# Patient Record
Sex: MALE | Race: WHITE | Employment: OTHER | URBAN - METROPOLITAN AREA
[De-identification: names, ages, dates, MRNs, and addresses within clinical notes are randomized per-mention and may not be internally consistent; named-entity substitution may affect disease eponyms.]

---

## 2017-01-01 ENCOUNTER — ANESTHESIA (OUTPATIENT)
Dept: ENDOSCOPY | Age: 67
End: 2017-01-01
Payer: MEDICARE

## 2017-01-01 ENCOUNTER — HOSPITAL ENCOUNTER (OUTPATIENT)
Age: 67
Setting detail: OUTPATIENT SURGERY
Discharge: HOME | End: 2017-09-14
Attending: INTERNAL MEDICINE | Admitting: INTERNAL MEDICINE
Payer: MEDICARE

## 2017-01-01 ENCOUNTER — ANESTHESIA EVENT (OUTPATIENT)
Dept: ENDOSCOPY | Age: 67
End: 2017-01-01
Payer: MEDICARE

## 2017-01-01 VITALS
DIASTOLIC BLOOD PRESSURE: 75 MMHG | OXYGEN SATURATION: 100 % | WEIGHT: 140 LBS | RESPIRATION RATE: 16 BRPM | HEART RATE: 56 BPM | TEMPERATURE: 98.6 F | BODY MASS INDEX: 24.8 KG/M2 | HEIGHT: 63 IN | SYSTOLIC BLOOD PRESSURE: 148 MMHG

## 2017-01-01 PROCEDURE — 74011250636 HC RX REV CODE- 250/636: Performed by: ANESTHESIOLOGY

## 2017-01-01 PROCEDURE — 74011250636 HC RX REV CODE- 250/636

## 2017-01-01 PROCEDURE — 74011250636 HC RX REV CODE- 250/636: Performed by: INTERNAL MEDICINE

## 2017-01-01 PROCEDURE — 76060000031 HC ANESTHESIA FIRST 0.5 HR: Performed by: INTERNAL MEDICINE

## 2017-01-01 PROCEDURE — 76040000025: Performed by: INTERNAL MEDICINE

## 2017-01-01 PROCEDURE — 74011000250 HC RX REV CODE- 250

## 2017-01-01 RX ORDER — SODIUM CHLORIDE, SODIUM LACTATE, POTASSIUM CHLORIDE, CALCIUM CHLORIDE 600; 310; 30; 20 MG/100ML; MG/100ML; MG/100ML; MG/100ML
100 INJECTION, SOLUTION INTRAVENOUS CONTINUOUS
Status: DISCONTINUED | OUTPATIENT
Start: 2017-01-01 | End: 2017-01-01 | Stop reason: HOSPADM

## 2017-01-01 RX ORDER — HEPARIN 100 UNIT/ML
500 SYRINGE INTRAVENOUS AS NEEDED
Status: DISCONTINUED | OUTPATIENT
Start: 2017-01-01 | End: 2017-01-01 | Stop reason: HOSPADM

## 2017-01-01 RX ORDER — LIDOCAINE HYDROCHLORIDE 20 MG/ML
INJECTION, SOLUTION EPIDURAL; INFILTRATION; INTRACAUDAL; PERINEURAL AS NEEDED
Status: DISCONTINUED | OUTPATIENT
Start: 2017-01-01 | End: 2017-01-01 | Stop reason: HOSPADM

## 2017-01-01 RX ORDER — SODIUM CHLORIDE 0.9 % (FLUSH) 0.9 %
5-10 SYRINGE (ML) INJECTION AS NEEDED
Status: CANCELLED | OUTPATIENT
Start: 2017-01-01

## 2017-01-01 RX ORDER — SODIUM CHLORIDE, SODIUM LACTATE, POTASSIUM CHLORIDE, CALCIUM CHLORIDE 600; 310; 30; 20 MG/100ML; MG/100ML; MG/100ML; MG/100ML
100 INJECTION, SOLUTION INTRAVENOUS CONTINUOUS
Status: CANCELLED | OUTPATIENT
Start: 2017-01-01

## 2017-01-01 RX ORDER — PROPOFOL 10 MG/ML
INJECTION, EMULSION INTRAVENOUS AS NEEDED
Status: DISCONTINUED | OUTPATIENT
Start: 2017-01-01 | End: 2017-01-01 | Stop reason: HOSPADM

## 2017-01-01 RX ORDER — PROPOFOL 10 MG/ML
INJECTION, EMULSION INTRAVENOUS
Status: DISCONTINUED | OUTPATIENT
Start: 2017-01-01 | End: 2017-01-01 | Stop reason: HOSPADM

## 2017-01-01 RX ADMIN — PROPOFOL 140 MCG/KG/MIN: 10 INJECTION, EMULSION INTRAVENOUS at 12:33

## 2017-01-01 RX ADMIN — LIDOCAINE HYDROCHLORIDE 60 MG: 20 INJECTION, SOLUTION EPIDURAL; INFILTRATION; INTRACAUDAL; PERINEURAL at 12:33

## 2017-01-01 RX ADMIN — SODIUM CHLORIDE, PRESERVATIVE FREE 500 UNITS: 5 INJECTION INTRAVENOUS at 13:45

## 2017-01-01 RX ADMIN — PROPOFOL 20 MG: 10 INJECTION, EMULSION INTRAVENOUS at 12:33

## 2017-01-01 RX ADMIN — SODIUM CHLORIDE, SODIUM LACTATE, POTASSIUM CHLORIDE, AND CALCIUM CHLORIDE 100 ML/HR: 600; 310; 30; 20 INJECTION, SOLUTION INTRAVENOUS at 12:06

## 2017-03-07 ENCOUNTER — SURGERY (OUTPATIENT)
Age: 67
End: 2017-03-07

## 2017-03-07 ENCOUNTER — HOSPITAL ENCOUNTER (OUTPATIENT)
Age: 67
Setting detail: OUTPATIENT SURGERY
Discharge: HOME | End: 2017-03-07
Attending: INTERNAL MEDICINE | Admitting: INTERNAL MEDICINE
Payer: MEDICARE

## 2017-03-07 ENCOUNTER — ANESTHESIA EVENT (OUTPATIENT)
Dept: ENDOSCOPY | Age: 67
End: 2017-03-07
Payer: MEDICARE

## 2017-03-07 ENCOUNTER — ANESTHESIA (OUTPATIENT)
Dept: ENDOSCOPY | Age: 67
End: 2017-03-07
Payer: MEDICARE

## 2017-03-07 VITALS
DIASTOLIC BLOOD PRESSURE: 67 MMHG | TEMPERATURE: 96.8 F | OXYGEN SATURATION: 94 % | RESPIRATION RATE: 12 BRPM | BODY MASS INDEX: 26.05 KG/M2 | WEIGHT: 147 LBS | HEIGHT: 63 IN | SYSTOLIC BLOOD PRESSURE: 132 MMHG | HEART RATE: 64 BPM

## 2017-03-07 PROCEDURE — 74011250636 HC RX REV CODE- 250/636: Performed by: INTERNAL MEDICINE

## 2017-03-07 PROCEDURE — C1726 CATH, BAL DIL, NON-VASCULAR: HCPCS | Performed by: INTERNAL MEDICINE

## 2017-03-07 PROCEDURE — 76040000025: Performed by: INTERNAL MEDICINE

## 2017-03-07 PROCEDURE — 74011000250 HC RX REV CODE- 250

## 2017-03-07 PROCEDURE — 76060000031 HC ANESTHESIA FIRST 0.5 HR: Performed by: INTERNAL MEDICINE

## 2017-03-07 PROCEDURE — 74011250636 HC RX REV CODE- 250/636

## 2017-03-07 RX ORDER — HEPARIN 100 UNIT/ML
300 SYRINGE INTRAVENOUS AS NEEDED
Status: DISCONTINUED | OUTPATIENT
Start: 2017-03-07 | End: 2017-03-07 | Stop reason: HOSPADM

## 2017-03-07 RX ORDER — PROPOFOL 10 MG/ML
INJECTION, EMULSION INTRAVENOUS
Status: DISCONTINUED | OUTPATIENT
Start: 2017-03-07 | End: 2017-03-07 | Stop reason: HOSPADM

## 2017-03-07 RX ORDER — PROPOFOL 10 MG/ML
INJECTION, EMULSION INTRAVENOUS AS NEEDED
Status: DISCONTINUED | OUTPATIENT
Start: 2017-03-07 | End: 2017-03-07 | Stop reason: HOSPADM

## 2017-03-07 RX ORDER — SODIUM CHLORIDE, SODIUM LACTATE, POTASSIUM CHLORIDE, CALCIUM CHLORIDE 600; 310; 30; 20 MG/100ML; MG/100ML; MG/100ML; MG/100ML
INJECTION, SOLUTION INTRAVENOUS
Status: DISCONTINUED | OUTPATIENT
Start: 2017-03-07 | End: 2017-03-07 | Stop reason: HOSPADM

## 2017-03-07 RX ORDER — LIDOCAINE HYDROCHLORIDE 20 MG/ML
INJECTION, SOLUTION EPIDURAL; INFILTRATION; INTRACAUDAL; PERINEURAL AS NEEDED
Status: DISCONTINUED | OUTPATIENT
Start: 2017-03-07 | End: 2017-03-07 | Stop reason: HOSPADM

## 2017-03-07 RX ADMIN — PROPOFOL 100 MG: 10 INJECTION, EMULSION INTRAVENOUS at 08:13

## 2017-03-07 RX ADMIN — PROPOFOL 140 MCG/KG/MIN: 10 INJECTION, EMULSION INTRAVENOUS at 08:13

## 2017-03-07 RX ADMIN — SODIUM CHLORIDE, SODIUM LACTATE, POTASSIUM CHLORIDE, CALCIUM CHLORIDE: 600; 310; 30; 20 INJECTION, SOLUTION INTRAVENOUS at 08:10

## 2017-03-07 RX ADMIN — LIDOCAINE HYDROCHLORIDE 100 MG: 20 INJECTION, SOLUTION EPIDURAL; INFILTRATION; INTRACAUDAL; PERINEURAL at 08:13

## 2017-03-07 RX ADMIN — SODIUM CHLORIDE, PRESERVATIVE FREE 300 UNITS: 5 INJECTION INTRAVENOUS at 09:07

## 2017-03-07 NOTE — ROUTINE PROCESS
Patient discharged via wheelchair. VSS on room air. No complaints of pain/discomfort (passing flatus accordingly). Tolerating PO fluids. Spoke with Dr. Dennie Francis at bedside. Discharge instructions given to responsible party, signed copy placed in chart. Escorted to transportation by bethanie Urban.
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
## 2017-03-07 NOTE — PROGRESS NOTES
Port to left upper chest de-accessed. Flushed port with 10 ml normal saline and 3 units heparin. Dry gauze pressure dressing applied.

## 2017-03-07 NOTE — OP NOTES
Date of Procedure: 3/7/2017     Procedure(s):  ESOPHAGOGASTRODUODENOSCOPY (EGD) WITH DILATION/ BMI=28  ESOPHAGEAL DILATION  Surgeon(s) and Role:     * Alicia Leos MD - Primary  Surgical Staff:  Endoscopy Technician-1: Norma Smith  Endoscopy RN-1: Carolina Akhtar  No case tracking events are documented in the log. Anesthesia: MAC   Estimated Blood Loss: about 10 mL  Specimens: * No specimens in log *   Complications: None. Implants: * No implants in log *    REFERRING PHYSICIAN: Concetta Chadwick MD, Brett Padron MD     PROCEDURE: Esophagogastroduodenoscopy with balloon dilation of   esophageal stricture.     INDICATION   1. Recurrent dysphagia. 2. History of esophageal stricture, previously dilated.     MEDICATIONS USED: The patient received IV monitored anesthesia   care to include Diprivan. Please see Anesthesia records for details.     INSTRUMENT USED: [id number].     PROCEDURE NOTE: After withholding of Plavix for 5 days and   required fasting, the patient came to the GI lab. Informed   consent was obtained. The patient was then brought to the   endoscopy suite and placed in standard left lateral position. He   received IV monitored anesthesia and remained comfortable   throughout the procedure. Vital signs and oxygen saturations   were monitored and remained stable. After adequate sedation had   been obtained, an Olympus videoendoscope was advanced under   direct vision. Normal mucosa seen in the pharynx, larynx, and   esophagus. EG junction showed a ring-like stricture through   which the endoscope passed without difficulty. A small hernial   sac was observed. Pylorus was identified and intubated. The   duodenal bulb and descending duodenum appeared normal. The   endoscope was then withdrawn back into the stomach which was   insufflated with air for optimal visualization of gastric   mucosa. Retroflexed view of the cardia showed a small hernial   sac without active inflammation.  Endoscope was straightened and   withdrawn. A CRE balloon dilator was then placed across the EG   junction and the stricture and serial dilations were performed   to 15 mm, 16.5 mm, and 18 mm. Superficial tearing and mild   bleeding was noted after the 16.5 and 18 mm dilation extending   proximally for about 15 mm up the lower esophagus. . Hemostasis   was confirmed prior to full withdrawal of the endoscope. The   patient tolerated the procedure well.     IMPRESSION   1. Small hiatal hernia. 2. Esophageal stricture at the esophagogastric junction that was   dilated to 18 mm with mild superficial linear tears indicating   good treatment response.     RECOMMENDATIONS   1. Continue Prevacid and other antireflux measures.    2. Followup upper endoscopy with dilation as needed.      Findings and recommendations were reviewed with the patient   and his wife in the recovery area after the procedure.  Maria Luisa Kay MD

## 2017-03-07 NOTE — DISCHARGE INSTRUCTIONS
Gastrointestinal Esophagogastroduodenoscopy (EGD) - Upper Exam Discharge Instructions    1. Call Dr. General Kumar for any problems or questions. 2. Contact the doctor's office for follow up appointment as directed. 3. Medication may cause drowsiness for several hours, therefore, do not drive or operate machinery for remainder of the day. 4. No alcohol today. 5. Ordinarily, you may resume regular diet and activity after exam unless otherwise specified by your physician. 6. For mild soreness in your throat you may use Cepacol throat lozenges or warm salt-water gargles as needed. Any additional instructions:    Continue PPI medication (acid reducing medication)  Repeat dilation as needed  Resume plavix on 3/9/17     Instructions given to Rhonda Flower and other family members.

## 2017-03-07 NOTE — ANESTHESIA PREPROCEDURE EVALUATION
Anesthetic History               Review of Systems / Medical History  Patient summary reviewed    Pulmonary    COPD: moderate      Smoker (Former)         Neuro/Psych       CVA: no residual symptoms       Cardiovascular    Hypertension: well controlled        Dysrhythmias (Bradycardia)   CAD    Exercise tolerance: >4 METS     GI/Hepatic/Renal     GERD: well controlled  Hepatitis: type C         Endo/Other    Diabetes (diet controlled)    Arthritis     Other Findings              Physical Exam    Airway  Mallampati: II    Neck ROM: normal range of motion   Mouth opening: Normal     Cardiovascular  Regular rate and rhythm,  S1 and S2 normal,  no murmur, click, rub, or gallop             Dental    Dentition: Edentulous     Pulmonary  Breath sounds clear to auscultation               Abdominal         Other Findings            Anesthetic Plan    ASA: 3  Anesthesia type: total IV anesthesia            Anesthetic plan and risks discussed with: Patient

## 2017-03-07 NOTE — ANESTHESIA POSTPROCEDURE EVALUATION
Post-Anesthesia Evaluation and Assessment    Patient: Isidra Browning MRN: [id number]  SSN: [id number]    YOB: 1950  Age: 69 y.o. Sex: male       Cardiovascular Function/Vital Signs  Visit Vitals    /53    Pulse (!) 58    Temp 36 °C (96.8 °F)    Resp 14    Ht 5' 3\" (1.6 m)    Wt 66.7 kg (147 lb)    SpO2 95%    BMI 26.04 kg/m2       Patient is status post total IV anesthesia anesthesia for Procedure(s):  ESOPHAGOGASTRODUODENOSCOPY (EGD) WITH DILATION/ BMI=28  ESOPHAGEAL DILATION. Nausea/Vomiting: None    Postoperative hydration reviewed and adequate. Pain:  Pain Scale 1: Visual (03/07/17 0843)  Pain Intensity 1: 0 (03/07/17 0843)   Managed    Neurological Status: At baseline    Mental Status and Level of Consciousness: Arousable    Pulmonary Status:   O2 Device: Nasal cannula (03/07/17 0843)   Adequate oxygenation and airway patent    Complications related to anesthesia: None    Post-anesthesia assessment completed.  No concerns    Signed By: Mariano Ann MD     March 7, 2017

## 2017-03-07 NOTE — IP AVS SNAPSHOT
Ora Schmitz 
 
 
 [address] [facility] [address] [facility] 
[phone] Patient: Manny Guajardo MRN: [id number] HDQ:7/09/9246 You are allergic to the following Allergen Reactions Codeine Hives Sulfa (Sulfonamide Antibiotics) Hives Atenolol Hives Niaspan (Niacin) Hives Paxil (Paroxetine Hcl) Hives Zoloft (Sertraline) Hives Recent Documentation Height Weight BMI Smoking Status 1.6 m 66.7 kg 26.04 kg/m2 Former Smoker Emergency Contacts Name Discharge Info Relation Home Work Mobile JenniferholaDiane DISCHARGE CAREGIVER [3] Spouse [3] [phone] About your hospitalization You were admitted on:  March 7, 2017 You last received care in the:  [facility] You were discharged on:  March 7, 2017 Unit phone number:  [phone] Why you were hospitalized Your primary diagnosis was:  Not on File Providers Seen During Your Hospitalizations Provider Role Specialty Primary office phone Hao Best MD Attending Provider Gastroenterology [phone] Your Primary Care Physician (PCP) Primary Care Physician Office Phone Office Fax Kat Loving [phone] Follow-up Information None Your Appointments Friday April 07, 2017 11:00 AM EDT Office Visit with Saul Chapman MD  
[address] Cardiology ([address] [place] [address]) [address] 
[address] [place] [address]  
[phone] Current Discharge Medication List  
  
ASK your doctor about these medications Dose & Instructions Dispensing Information Comments Morning Noon Evening Bedtime  
 albuterol 90 mcg/actuation inhaler Commonly known as:  PROVENTIL HFA, VENTOLIN HFA, PROAIR HFA Your next dose is: Today, Tomorrow Other:  _________ Dose:  1-2 Puff Take 1-2 Puffs by inhalation every six (6) hours as needed for Wheezing. Refills:  0  
     
   
   
   
  
 aspirin delayed-release 81 mg tablet Your next dose is: Today, Tomorrow Other:  _________ Dose:  81 mg Take 81 mg by mouth every morning. Refills:  0  
     
   
   
   
  
 buPROPion 100 mg tablet Commonly known as:  [facility] Your next dose is: Today, Tomorrow Other:  _________ Dose:  100 mg Take 100 mg by mouth every morning. Refills:  0  
     
   
   
   
  
 clobetasol 0.05 % topical cream  
Commonly known as:  Selina Patee Your next dose is: Today, Tomorrow Other:  _________ Apply  to affected area two (2) times daily as needed. Refills:  0  
     
   
   
   
  
 clonazePAM 0.5 mg tablet Commonly known as:  Karolyn Patch Your next dose is: Today, Tomorrow Other:  _________ Dose:  0.5 mg Take 0.5 mg by mouth three (3) times daily. Refills:  0  
     
   
   
   
  
 FLOMAX 0.4 mg capsule Generic drug:  tamsulosin Your next dose is: Today, Tomorrow Other:  _________ Dose:  0.4 mg Take 0.4 mg by mouth every morning. Refills:  0  
     
   
   
   
  
 lansoprazole 15 mg capsule Commonly known as:  PREVACID Your next dose is: Today, Tomorrow Other:  _________ Dose:  30 mg Take 30 mg by mouth Daily (before breakfast). Takes 2 caps 15 mg each daily Refills:  0  
     
   
   
   
  
 lithium carbonate 300 mg capsule Your next dose is: Today, Tomorrow Other:  _________ Dose:  300 mg Take 300 mg by mouth three (3) times daily. Refills:  0 PLAVIX 75 mg Tab Generic drug:  clopidogrel Your next dose is: Today, Tomorrow Other:  _________ Dose:  75 mg Take 75 mg by mouth daily. Refills:  0  
     
   
   
   
  
 pravastatin 10 mg tablet Commonly known as:  PRAVACHOL Your next dose is: Today, Tomorrow Other:  _________ Dose:  10 mg Take 10 mg by mouth nightly. Refills:  0  
     
   
   
   
  
 traMADol 50 mg tablet Commonly known as:  ULTRAM  
   
Your next dose is: Today, Tomorrow Other:  _________ Dose:  50 mg Take 50 mg by mouth every six (6) hours as needed for Pain. Refills:  0  
     
   
   
   
  
 umeclidinium-vilanterol 62.5-25 mcg/actuation inhaler Commonly known as:  Sarina Obey Your next dose is: Today, Tomorrow Other:  _________ Dose:  1 Puff Take 1 Puff by inhalation daily. Refills:  0 Discharge Instructions Gastrointestinal Esophagogastroduodenoscopy (EGD) - Upper Exam Discharge Instructions 1. Call Dr. Elizabeth Jimenez for any problems or questions. 2. Contact the doctor's office for follow up appointment as directed. 3. Medication may cause drowsiness for several hours, therefore, do not drive or operate machinery for remainder of the day. 4. No alcohol today. 5. Ordinarily, you may resume regular diet and activity after exam unless otherwise specified by your physician. 6. For mild soreness in your throat you may use Cepacol throat lozenges or warm salt-water gargles as needed. Any additional instructions:   
Continue PPI medication (acid reducing medication) Repeat dilation as needed Resume plavix on 3/9/17 Instructions given to Manny Guajardo and other family members. Discharge Orders None Introducing [facility]! Yadi Lyn introduces [employer] patient portal. Now you can access parts of your medical record, email your doctor's office, and request medication refills online. 1. In your internet browser, go to [email or web address] 2. Click on the First Time User? Click Here link in the Sign In box. You will see the New Member Sign Up page. 3. Enter your [employer] Access Code exactly as it appears below. You will not need to use this code after youve completed the sign-up process. If you do not sign up before the expiration date, you must request a new code. · [employer] Access Code: [email or web address] Expires: 5/24/2017 10:41 AM 
 
4. Enter the last four digits of your Social Security Number (xxxx) and Date of Birth (mm/dd/yyyy) as indicated and click Submit. You will be taken to the next sign-up page. 5. Create a [employer] ID. This will be your [employer] login ID and cannot be changed, so think of one that is secure and easy to remember. 6. Create a [employer] password. You can change your password at any time. 7. Enter your Password Reset Question and Answer. This can be used at a later time if you forget your password. 8. Enter your e-mail address. You will receive e-mail notification when new information is available in [address] 9. Click Sign Up. You can now view and download portions of your medical record. 10. Click the Download Summary menu link to download a portable copy of your medical information. If you have questions, please visit the Frequently Asked Questions section of the [employer] website. Remember, [employer] is NOT to be used for urgent needs. For medical emergencies, dial 911. Now available from your iPhone and Android! General Information Please provide this summary of care documentation to your next provider. Patient Signature:  ____________________________________________________________ Date:  ____________________________________________________________  
  
Andrew Artist Provider Signature:  ____________________________________________________________ Date:  ____________________________________________________________

## 2017-09-14 NOTE — DISCHARGE INSTRUCTIONS
Gastrointestinal Esophagogastroduodenoscopy (EGD) - Upper Exam Discharge Instructions    1. Call Dr. Iva Silver at [phone] for any problems or questions. 2. Contact the doctor's office for follow up appointment as directed. 3. Medication may cause drowsiness for several hours, therefore, do not drive or  operate machinery for remainder of the day. 4. No alcohol today. 5. Ordinarily, you may resume regular diet and activity after exam unless otherwise specified by your physician. 6. For mild soreness in your throat you may use Cepacol throat lozenges or warm  salt-water gargles as needed. Any additional instructions: Follow up with Dr Iva Silver tel: [phone] as needed   Continue Pepcid   Avoid gastric irritants   Resume Plavix tomorrow      Instructions given to Yrn Randall and other family members.

## 2017-09-14 NOTE — ANESTHESIA POSTPROCEDURE EVALUATION
Post-Anesthesia Evaluation and Assessment    Patient: Elena Velazquez MRN: [id number]  SSN: [id number]    YOB: 1950  Age: 69 y.o. Sex: male       Cardiovascular Function/Vital Signs  Visit Vitals    /75    Pulse (!) 56    Temp 37 °C (98.6 °F)    Resp 16    Ht 5' 3\" (1.6 m)    Wt 63.5 kg (140 lb)    SpO2 100%    BMI 24.8 kg/m2       Patient is status post total IV anesthesia anesthesia for Procedure(s):  ESOPHAGOGASTRODUODENOSCOPY (EGD)/ 27  ESOPHAGEAL DILATION. Nausea/Vomiting: None    Postoperative hydration reviewed and adequate. Pain:  Pain Scale 1: Numeric (0 - 10) (09/14/17 1310)  Pain Intensity 1: 0 (09/14/17 1310)   Managed    Neurological Status: At baseline    Mental Status and Level of Consciousness: Arousable    Pulmonary Status:   O2 Device: Room air (09/14/17 1310)   Adequate oxygenation and airway patent    Complications related to anesthesia: None    Post-anesthesia assessment completed.  No concerns    Signed By: Elvie Sheridan MD     September 14, 2017

## 2017-09-14 NOTE — PROCEDURES
OPERATIVE NOTE    Date of Procedure: 9/14/2017   Preoperative Diagnosis: Dysphagia, unspecified [R13.10]  Postoperative Diagnosis: esophageal stricture, hiatal hernia, Gastro-duodenitis    Procedure(s):  ESOPHAGOGASTRODUODENOSCOPY (EGD)/ 27  ESOPHAGEAL DILATION WITH SAVARY DILATORS OVER GUIDEWIRE WITHOUT FLUOROSCOPY   Surgeon(s) and Role:     * Christina Thakur MD - Primary  Assistant Staff:  Surgical Staff:  Endoscopy Technician-1: Mynor Martinez  Endoscopy RN-1: Eric Bro RN  Endoscopy RN-2: Teofilo Lowry RN  No case tracking events are documented in the log. Anesthesia: MAC   Estimated Blood Loss: < 10 mL  Specimens: * No specimens in log *     Procedure:  After obtaining informed consent, the patient was placed in the left lateral position and received IV anesthesia. See anesthesia records for details. The endoscope was advanced under direct vision without difficulty. The esophagus, stomach (including retroflexed views) and duodenum were evaluated. Esophageal dilation performed as described below with repeat endoscopy after dilations. The patient was then taken to the recovery area in stable condition. Findings:  OROPHARYNX:  Cords and Pyriform recesses appeared normal. Arytenoids were edematous and mildly asymmetrical.   ESOPHAGUS:  The proximal and mid esophagus appeared normal. The distal esophagus showed angulation and luminal narrowing. The Z line was intact without evidence of esophagitis, ulcerations or Kang's. A non-obstructive ring was noted through which the scope was passed without difficulty. Serial dilations were performed with 45, 48 and 51 Fr Savary dilators over guide wire placed by endoscope without fluoroscopy in usual fashion with mild resistance and heme with larger dilators. Repeat Endoscopy after dilation showed mild trauma at the Esophageal ring and small superficial linear tears above the Z line with mild bleeding and spontaneous hemostasis.   STOMACH:  The fundus on antegrade and retroflexed views was normal apart from a small hiatal hernia. The antrum showed focal erythema consistent with mild gastritis. The body and pylorus also appeared normal.  DUODENUM:  The bulb also showed focal erythema consistent with mild duodenitis. The second portion appeared normal.    Recommendations:   Routine post endoscopy + dilation instructions. Continue Pepcid. Avoid gastric irritants. May resume Plavix tomorrow. Barium swallow with tablet challenge if he has no improvement of dysphagia with dilation - possible dysmotility. Repeat dilation if symptoms resolve but recur. Findings and recommendations were reviewed with patient and wife after the procedure. Follow-up Office Visit based on clinical course.     Complications: None  Implants: * No implants in log *    Shannon Gtz MD

## 2017-09-14 NOTE — IP AVS SNAPSHOT
Jose Em 
 
 
 [address] [facility] [address] [facility] 
[phone] Patient: Brent Diaz MRN: [id number] You are allergic to the following Allergen Reactions Codeine Hives Sulfa (Sulfonamide Antibiotics) Hives Atenolol Hives Niaspan (Niacin) Hives Paxil (Paroxetine Hcl) Hives Zoloft (Sertraline) Hives Recent Documentation Height Weight BMI Smoking Status 1.6 m 63.5 kg 24.8 kg/m2 Former Smoker Emergency Contacts Name Discharge Info Relation Home Work Mobile Diane Gtz DISCHARGE CAREGIVER [3] Spouse [3] [phone] About your hospitalization You were admitted on:  September 14, 2017 You last received care in the:  [facility] You were discharged on:  September 14, 2017 Unit phone number:  [phone] Why you were hospitalized Your primary diagnosis was:  Not on File Providers Seen During Your Hospitalizations Provider Role Specialty Primary office phone Robyn Patel MD Attending Provider Gastroenterology [phone] Your Primary Care Physician (PCP) Primary Care Physician Office Phone Office Fax Kavita Santiago [phone] Follow-up Information None Your Appointments Tuesday October 10, 2017  1:30 PM EDT  
ECHOCARDIOGRAM with BENJAMIN ECHO [address] [facility] CARDIOLOGY [place] OFFICE ([address] [place] [address]) 2 [facility] 
[address] Monika Hassan   
[phone] Current Discharge Medication List  
  
ASK your doctor about these medications Dose & Instructions Dispensing Information Comments Morning Noon Evening Bedtime  
 albuterol 90 mcg/actuation inhaler Commonly known as:  PROVENTIL HFA, VENTOLIN HFA, PROAIR HFA Your last dose was: Your next dose is:    
   
   
 Dose:  1-2 Puff Take 1-2 Puffs by inhalation every six (6) hours as needed for Wheezing. Refills:  0  
     
   
   
   
  
 aspirin delayed-release 81 mg tablet Your last dose was: Your next dose is:    
   
   
 Dose:  81 mg Take 81 mg by mouth every morning. Refills:  0  
     
   
   
   
  
 buPROPion 100 mg tablet Commonly known as:  [facility] Your last dose was: Your next dose is:    
   
   
 Dose:  100 mg Take 100 mg by mouth every morning. Refills:  0  
     
   
   
   
  
 clobetasol 0.05 % topical cream  
Commonly known as:  Maryjane Lopez Your last dose was: Your next dose is:    
   
   
 Apply  to affected area two (2) times daily as needed. Refills:  0  
     
   
   
   
  
 clonazePAM 0.5 mg tablet Commonly known as:  Tyesha Smith Your last dose was: Your next dose is:    
   
   
 Dose:  0.5 mg Take 0.5 mg by mouth three (3) times daily. Refills:  0  
     
   
   
   
  
 FLOMAX 0.4 mg capsule Generic drug:  tamsulosin Your last dose was: Your next dose is:    
   
   
 Dose:  0.4 mg Take 0.4 mg by mouth every morning. Refills:  0  
     
   
   
   
  
 lithium carbonate 300 mg capsule Your last dose was: Your next dose is:    
   
   
 Dose:  300 mg Take 300 mg by mouth three (3) times daily. Refills:  0 LORazepam 1 mg tablet Commonly known as:  ATIVAN Your last dose was: Your next dose is:    
   
   
 Dose:  0.5-1 Tab Take 0.5-1 Tabs by mouth every four (4) hours as needed. Refills:  1  
     
   
   
   
  
 minocycline 100 mg capsule Commonly known as:  Lilia Covington Your last dose was: Your next dose is:    
   
   
 Dose:  1 Cap Take 1 Cap by mouth daily. Refills:  5 OLANZapine 5 mg tablet Commonly known as:  ZyPREXA Your last dose was: Your next dose is:    
   
   
 Dose:  1 Tab Take 1 Tab by mouth as needed. Refills:  2 PEPCID 40 mg tablet Generic drug:  famotidine Your last dose was: Your next dose is:    
   
   
 Dose:  40 mg Take 40 mg by mouth every morning. Refills:  0 PLAVIX 75 mg Tab Generic drug:  clopidogrel Your last dose was: Your next dose is:    
   
   
 Dose:  75 mg Take 75 mg by mouth daily. Held for procedure 9/8/17- hx of multiple strokes - states held with permission from Dr Duane Licona PCP Refills:  0  
     
   
   
   
  
 pravastatin 10 mg tablet Commonly known as:  PRAVACHOL Your last dose was: Your next dose is: TAKE 1 TABLET BY MOUTH EVERY EVENING Quantity:  90 Tab Refills:  1 TARCEVA 150 mg tablet Generic drug:  erlotinib Your last dose was: Your next dose is:    
   
   
 Dose:  150 mg Take 150 mg by mouth every morning. Refills:  0  
     
   
   
   
  
 traMADol 50 mg tablet Commonly known as:  ULTRAM  
   
Your last dose was: Your next dose is:    
   
   
 Dose:  50 mg Take 50 mg by mouth every six (6) hours as needed for Pain. Refills:  0  
     
   
   
   
  
 umeclidinium-vilanterol 62.5-25 mcg/actuation inhaler Commonly known as:  Kaylee Knoll Your last dose was: Your next dose is:    
   
   
 Dose:  1 Puff Take 1 Puff by inhalation daily. Refills:  0 Discharge Instructions Gastrointestinal Esophagogastroduodenoscopy (EGD) - Upper Exam Discharge Instructions 1. Call Dr. Machelle Olivas at [phone] for any problems or questions. 2. Contact the doctor's office for follow up appointment as directed. 3. Medication may cause drowsiness for several hours, therefore, do not drive or  operate machinery for remainder of the day. 4. No alcohol today. 5. Ordinarily, you may resume regular diet and activity after exam unless otherwise specified by your physician. 6. For mild soreness in your throat you may use Cepacol throat lozenges or warm  salt-water gargles as needed. Any additional instructions: Follow up with Dr Vicki Capone tel: [phone] as needed Continue Pepcid Avoid gastric irritants Resume Plavix tomorrow Instructions given to Praveen Diaz and other family members. Discharge Orders None ACO Transitions of Care Introducing [facility] [address] offers a voluntary care coordination program to provide high quality service and care to [facility] fee-for-service beneficiaries. Joelle Hernandez was designed to help you enhance your health and well-being through the following services: ? Transitions of Care  support for individuals who are transitioning from one care setting to another (example: Hospital to home). ? Chronic and Complex Care Coordination  support for individuals and caregivers of those with serious or chronic illnesses or with more than one chronic (ongoing) condition and those who take a number of different medications. If you meet specific medical criteria, a [address] [place] [address] Rd may call you directly to coordinate your care with your primary care physician and your other care providers. For questions about the [facility] programs, please, contact your physicians office. For general questions or additional information about Accountable Care Organizations: 
Please visit www.medicare.gov/acos. html or call 1-800-MEDICARE (5-949.121.8544) TTY users should call 7-925.913.7947. Introducing [facility]! [employer] introduces [employer] patient portal. Now you can access parts of your medical record, email your doctor's office, and request medication refills online.    
 
1. In your internet browser, go to [email or web address] 2. Click on the First Time User? Click Here link in the Sign In box. You will see the New Member Sign Up page. 3. Enter your [employer] Access Code exactly as it appears below. You will not need to use this code after youve completed the sign-up process. If you do not sign up before the expiration date, you must request a new code. · [employer] Access Code: [email or web address] Expires: 12/12/2017 11:26 AM 
 
4. Enter the last four digits of your Social Security Number (xxxx) and Date of Birth (mm/dd/yyyy) as indicated and click Submit. You will be taken to the next sign-up page. 5. Create a [employer] ID. This will be your [employer] login ID and cannot be changed, so think of one that is secure and easy to remember. 6. Create a [employer] password. You can change your password at any time. 7. Enter your Password Reset Question and Answer. This can be used at a later time if you forget your password. 8. Enter your e-mail address. You will receive e-mail notification when new information is available in [address] 9. Click Sign Up. You can now view and download portions of your medical record. 10. Click the Download Summary menu link to download a portable copy of your medical information. If you have questions, please visit the Frequently Asked Questions section of the [employer] website. Remember, [employer] is NOT to be used for urgent needs. For medical emergencies, dial 911. Now available from your iPhone and Android! General Information Please provide this summary of care documentation to your next provider. Patient Signature:  ____________________________________________________________ Date:  ____________________________________________________________  
  
Abdiaziz Snipe Provider Signature:  ____________________________________________________________ Date:  ____________________________________________________________

## 2017-09-14 NOTE — ANESTHESIA PREPROCEDURE EVALUATION
Anesthetic History               Review of Systems / Medical History  Patient summary reviewed    Pulmonary    COPD: moderate      Smoker (Former)         Neuro/Psych       CVA: no residual symptoms       Cardiovascular    Hypertension: well controlled        Dysrhythmias (Bradycardia)   CAD    Exercise tolerance: >4 METS     GI/Hepatic/Renal     GERD: well controlled  Hepatitis: type C         Endo/Other    Diabetes (diet controlled)    Arthritis and cancer (laryngeal s/p radiation - currently undergoing oral chemo)     Other Findings              Physical Exam    Airway  Mallampati: I  TM Distance: 4 - 6 cm  Neck ROM: normal range of motion   Mouth opening: Normal     Cardiovascular  Regular rate and rhythm,  S1 and S2 normal,  no murmur, click, rub, or gallop  Rhythm: regular  Rate: normal         Dental    Dentition: Full lower dentures and Full upper dentures     Pulmonary  Breath sounds clear to auscultation               Abdominal         Other Findings            Anesthetic Plan    ASA: 3  Anesthesia type: total IV anesthesia          Induction: Intravenous  Anesthetic plan and risks discussed with: Patient

## 2018-01-01 ENCOUNTER — HOSPICE ADMISSION (OUTPATIENT)
Dept: HOSPICE | Facility: HOSPICE | Age: 68
End: 2018-01-01
Payer: MEDICARE

## 2018-01-01 ENCOUNTER — HOSPITAL ENCOUNTER (INPATIENT)
Age: 68
LOS: 1 days | End: 2018-04-13
Attending: INTERNAL MEDICINE | Admitting: INTERNAL MEDICINE

## 2018-01-01 VITALS
TEMPERATURE: 99 F | RESPIRATION RATE: 18 BRPM | SYSTOLIC BLOOD PRESSURE: 118 MMHG | HEART RATE: 137 BPM | DIASTOLIC BLOOD PRESSURE: 77 MMHG

## 2018-01-01 PROCEDURE — 74011000250 HC RX REV CODE- 250: Performed by: NURSE PRACTITIONER

## 2018-01-01 PROCEDURE — 0656 HSPC GENERAL INPATIENT

## 2018-01-01 PROCEDURE — 74011250637 HC RX REV CODE- 250/637: Performed by: NURSE PRACTITIONER

## 2018-01-01 PROCEDURE — 74011250636 HC RX REV CODE- 250/636: Performed by: NURSE PRACTITIONER

## 2018-01-01 PROCEDURE — 3336500001 HSPC ELECTION

## 2018-01-01 RX ORDER — HALOPERIDOL 5 MG/ML
2 INJECTION INTRAMUSCULAR
Status: DISCONTINUED | OUTPATIENT
Start: 2018-01-01 | End: 2018-01-01 | Stop reason: HOSPADM

## 2018-01-01 RX ORDER — MORPHINE SULFATE 10 MG/ML
2 INJECTION, SOLUTION INTRAMUSCULAR; INTRAVENOUS
Status: DISCONTINUED | OUTPATIENT
Start: 2018-01-01 | End: 2018-01-01 | Stop reason: HOSPADM

## 2018-01-01 RX ORDER — HEPARIN 100 UNIT/ML
300 SYRINGE INTRAVENOUS AS NEEDED
Status: DISCONTINUED | OUTPATIENT
Start: 2018-01-01 | End: 2018-01-01 | Stop reason: HOSPADM

## 2018-01-01 RX ORDER — ACETAMINOPHEN 650 MG/1
650 SUPPOSITORY RECTAL
Status: DISCONTINUED | OUTPATIENT
Start: 2018-01-01 | End: 2018-01-01 | Stop reason: HOSPADM

## 2018-01-01 RX ORDER — FENTANYL 12.5 UG/1
1 PATCH TRANSDERMAL
Status: DISCONTINUED | OUTPATIENT
Start: 2018-01-01 | End: 2018-01-01 | Stop reason: HOSPADM

## 2018-01-01 RX ORDER — FACIAL-BODY WIPES
10 EACH TOPICAL AS NEEDED
Status: DISCONTINUED | OUTPATIENT
Start: 2018-01-01 | End: 2018-01-01 | Stop reason: HOSPADM

## 2018-01-01 RX ORDER — SODIUM CHLORIDE 0.9 % (FLUSH) 0.9 %
10 SYRINGE (ML) INJECTION EVERY 12 HOURS
Status: DISCONTINUED | OUTPATIENT
Start: 2018-01-01 | End: 2018-01-01 | Stop reason: HOSPADM

## 2018-01-01 RX ORDER — HEPARIN 100 UNIT/ML
300 SYRINGE INTRAVENOUS EVERY 12 HOURS
Status: DISCONTINUED | OUTPATIENT
Start: 2018-01-01 | End: 2018-01-01 | Stop reason: HOSPADM

## 2018-01-01 RX ORDER — LORAZEPAM 2 MG/ML
1 INJECTION INTRAMUSCULAR
Status: DISCONTINUED | OUTPATIENT
Start: 2018-01-01 | End: 2018-01-01 | Stop reason: HOSPADM

## 2018-01-01 RX ORDER — SODIUM CHLORIDE 0.9 % (FLUSH) 0.9 %
10 SYRINGE (ML) INJECTION AS NEEDED
Status: DISCONTINUED | OUTPATIENT
Start: 2018-01-01 | End: 2018-01-01 | Stop reason: HOSPADM

## 2018-01-01 RX ORDER — IPRATROPIUM BROMIDE AND ALBUTEROL SULFATE 2.5; .5 MG/3ML; MG/3ML
3 SOLUTION RESPIRATORY (INHALATION)
Status: DISCONTINUED | OUTPATIENT
Start: 2018-01-01 | End: 2018-01-01 | Stop reason: HOSPADM

## 2018-01-01 RX ORDER — GLYCOPYRROLATE 0.2 MG/ML
0.2 INJECTION INTRAMUSCULAR; INTRAVENOUS
Status: DISCONTINUED | OUTPATIENT
Start: 2018-01-01 | End: 2018-01-01 | Stop reason: HOSPADM

## 2018-01-01 RX ADMIN — SODIUM CHLORIDE, PRESERVATIVE FREE 300 UNITS: 5 INJECTION INTRAVENOUS at 22:10

## 2018-01-01 RX ADMIN — SODIUM CHLORIDE, PRESERVATIVE FREE 10 ML: 5 INJECTION INTRAVENOUS at 22:11

## 2018-01-01 RX ADMIN — LORAZEPAM 1 MG: 2 INJECTION, SOLUTION INTRAMUSCULAR; INTRAVENOUS at 22:11

## 2018-01-01 RX ADMIN — HALOPERIDOL LACTATE 2 MG: 5 INJECTION INTRAMUSCULAR at 09:10

## 2018-01-01 RX ADMIN — HALOPERIDOL LACTATE 2 MG: 5 INJECTION INTRAMUSCULAR at 22:11

## 2018-01-01 RX ADMIN — MORPHINE SULFATE 2 MG: 10 INJECTION INTRAVENOUS at 09:10

## 2018-01-01 RX ADMIN — GLYCOPYRROLATE 0.2 MG: 0.2 INJECTION INTRAMUSCULAR; INTRAVENOUS at 01:08

## 2018-01-01 RX ADMIN — MORPHINE SULFATE 2 MG: 10 INJECTION INTRAVENOUS at 06:11

## 2018-01-01 RX ADMIN — SODIUM CHLORIDE, PRESERVATIVE FREE 10 ML: 5 INJECTION INTRAVENOUS at 09:10

## 2018-01-01 RX ADMIN — SODIUM CHLORIDE, PRESERVATIVE FREE 300 UNITS: 5 INJECTION INTRAVENOUS at 09:11

## 2018-01-01 RX ADMIN — MORPHINE SULFATE 2 MG: 10 INJECTION INTRAVENOUS at 22:11

## 2018-01-01 RX ADMIN — MORPHINE SULFATE 2 MG: 10 INJECTION INTRAVENOUS at 01:08

## 2018-01-01 RX ADMIN — GLYCOPYRROLATE 0.2 MG: 0.2 INJECTION INTRAMUSCULAR; INTRAVENOUS at 06:11

## 2018-04-12 PROBLEM — D70.1 CHEMOTHERAPY INDUCED NEUTROPENIA (HCC): Status: ACTIVE | Noted: 2018-01-01

## 2018-04-12 PROBLEM — A41.9 SEPSIS DUE TO PNEUMONIA (HCC): Status: ACTIVE | Noted: 2018-01-01

## 2018-04-12 PROBLEM — Z95.828 PORT CATHETER IN PLACE: Status: ACTIVE | Noted: 2017-01-01

## 2018-04-12 PROBLEM — C79.51 SECONDARY MALIGNANT NEOPLASM OF BONE (HCC): Status: ACTIVE | Noted: 2018-01-01

## 2018-04-12 PROBLEM — N40.0 BPH (BENIGN PROSTATIC HYPERPLASIA): Status: ACTIVE | Noted: 2018-01-01

## 2018-04-12 PROBLEM — C78.02 MALIGNANT NEOPLASM METASTATIC TO LEFT LUNG (HCC): Status: ACTIVE | Noted: 2017-03-15

## 2018-04-12 PROBLEM — C34.92 SQUAMOUS CELL CARCINOMA LUNG, LEFT (HCC): Status: ACTIVE | Noted: 2018-01-01

## 2018-04-12 PROBLEM — R13.12 OROPHARYNGEAL DYSPHAGIA: Status: ACTIVE | Noted: 2018-01-01

## 2018-04-12 PROBLEM — J18.9 SEPSIS DUE TO PNEUMONIA (HCC): Status: ACTIVE | Noted: 2018-01-01

## 2018-04-12 PROBLEM — T45.1X5A CHEMOTHERAPY INDUCED NEUTROPENIA (HCC): Status: ACTIVE | Noted: 2018-01-01

## 2018-04-13 NOTE — PROGRESS NOTES
provided spiritual and emotional support following patient's death. Family grieving appropriately.  offered words of hope and prayer.  encouraged family to take advantage of our Bereavement Services. Note:  Patient's journey prior to getting to hospice was a bit charis.

## 2018-04-13 NOTE — PROGRESS NOTES
Patient arrives to unit on stretcher via EMS. No family present at this time. Patient is very dyspneic and anxious. Patient is trying to get up and pulling oxygen off. Left chest port accessed. Will medicate per MAR. Assessment in progress.

## 2018-04-13 NOTE — PROGRESS NOTES
Report received. Pt round. Pt identified by name. In bed with eyes closed. No s/s of pain, agitation or dyspnea. Fentanyl patch in place on right upper arm. Bed low and SR up with tab alerts on. Family at bedside.

## 2018-04-14 PROCEDURE — 0656 HSPC GENERAL INPATIENT

## 2018-04-15 PROCEDURE — 0656 HSPC GENERAL INPATIENT

## 2019-07-31 NOTE — PROGRESS NOTES
LMSW placed the original DNR in the unit notebook Patient needs to complete ordered lab work for any further refills.

## (undated) DEVICE — ESOPHAGEAL BALLOON DILATATION CATHETER: Brand: CRE FIXED WIRE

## (undated) DEVICE — BLOCK BITE AD 60FR W/ VELC STRP ADDRESSES MOST PT AND

## (undated) DEVICE — CONNECTOR TBNG OD5-7MM O2 END DISP

## (undated) DEVICE — KENDALL RADIOLUCENT FOAM MONITORING ELECTRODE RECTANGULAR SHAPE: Brand: KENDALL

## (undated) DEVICE — CANNULA NSL ORAL AD FOR CAPNOFLEX CO2 O2 AIRLFE